# Patient Record
Sex: MALE | Race: WHITE | URBAN - METROPOLITAN AREA
[De-identification: names, ages, dates, MRNs, and addresses within clinical notes are randomized per-mention and may not be internally consistent; named-entity substitution may affect disease eponyms.]

---

## 2022-07-21 ENCOUNTER — OFFICE VISIT (OUTPATIENT)
Dept: URBAN - METROPOLITAN AREA CLINIC 44 | Facility: CLINIC | Age: 57
End: 2022-07-21
Payer: COMMERCIAL

## 2022-07-21 DIAGNOSIS — H04.123 TEAR FILM INSUFFICIENCY OF BILATERAL LACRIMAL GLANDS: ICD-10-CM

## 2022-07-21 DIAGNOSIS — H25.12 AGE-RELATED NUCLEAR CATARACT, LEFT EYE: ICD-10-CM

## 2022-07-21 DIAGNOSIS — Z96.1 PRESENCE OF INTRAOCULAR LENS: ICD-10-CM

## 2022-07-21 DIAGNOSIS — D31.32 BENIGN NEOPLASM OF LEFT CHOROID: ICD-10-CM

## 2022-07-21 DIAGNOSIS — H43.313 VITREOUS MEMBRANES AND STRANDS, BILATERAL: Primary | ICD-10-CM

## 2022-07-21 PROCEDURE — 92004 COMPRE OPH EXAM NEW PT 1/>: CPT | Performed by: OPTOMETRIST

## 2022-07-21 ASSESSMENT — INTRAOCULAR PRESSURE
OS: 19
OD: 18

## 2022-07-21 ASSESSMENT — KERATOMETRY
OS: 45.00
OD: 45.50

## 2022-07-21 ASSESSMENT — VISUAL ACUITY
OS: 20/20
OD: 20/20

## 2022-07-21 NOTE — IMPRESSION/PLAN
Impression: Benign neoplasm of left choroid: D31.32. Plan: PLAN: Discussed finding with patient and observe.   RTC 12 months for complete + OPTOs

## 2022-07-21 NOTE — IMPRESSION/PLAN
Impression: Vitreous membranes and strands, bilateral: H43.313. Patient reports new symptom's of flashes and floaters. No Hx of RD. Fundus exam is unremarkable for retinal breaks, tears, holes or retinal detachments. Plan: PLAN: Discussed S/S of RD/RT. Stressed importance of RTC immediately if S/S progress.

## 2022-07-21 NOTE — IMPRESSION/PLAN
Impression: Age-related nuclear cataract, left eye: H25.12. Visually non-significant cataracts. Patient asymptomatic and comfortable with current level of vision. Plan: PLAN: RTC 12 months for complete exam complete + OCT ([facility]). RTC sooner if patient symptoms become worse. Pt calling for covid-19 results  Recorded as negative in chart, gave result to pt as requested.  Dionne Muñoz RN  [facility] Nurse Advisor    Reason for Disposition    Information only question and nurse able to answer    Protocols used: NO PROTOCOL AVAILABLE - INFORMATION ONLY-[place]

## 2023-11-13 ENCOUNTER — OFFICE VISIT (OUTPATIENT)
Dept: URBAN - METROPOLITAN AREA CLINIC 44 | Facility: CLINIC | Age: 58
End: 2023-11-13
Payer: COMMERCIAL

## 2023-11-13 DIAGNOSIS — H25.12 AGE-RELATED NUCLEAR CATARACT, LEFT EYE: ICD-10-CM

## 2023-11-13 DIAGNOSIS — D31.32 BENIGN NEOPLASM OF LEFT CHOROID: Primary | ICD-10-CM

## 2023-11-13 DIAGNOSIS — H43.313 VITREOUS MEMBRANES AND STRANDS, BILATERAL: ICD-10-CM

## 2023-11-13 DIAGNOSIS — H26.491 OTHER SECONDARY CATARACT, RIGHT EYE: ICD-10-CM

## 2023-11-13 PROCEDURE — 92014 COMPRE OPH EXAM EST PT 1/>: CPT | Performed by: OPTOMETRIST

## 2023-11-13 ASSESSMENT — INTRAOCULAR PRESSURE
OD: 17
OS: 17

## 2023-11-13 ASSESSMENT — KERATOMETRY
OD: 45.38
OS: 45.50

## 2023-11-13 ASSESSMENT — VISUAL ACUITY
OD: 20/20
OS: 20/20

## 2024-11-19 ENCOUNTER — OFFICE VISIT (OUTPATIENT)
Dept: URBAN - METROPOLITAN AREA CLINIC 44 | Facility: CLINIC | Age: 59
End: 2024-11-19
Payer: COMMERCIAL

## 2024-11-19 DIAGNOSIS — D31.32 BENIGN NEOPLASM OF LEFT CHOROID: Primary | ICD-10-CM

## 2024-11-19 DIAGNOSIS — H52.4 PRESBYOPIA: ICD-10-CM

## 2024-11-19 DIAGNOSIS — H25.12 AGE-RELATED NUCLEAR CATARACT, LEFT EYE: ICD-10-CM

## 2024-11-19 PROCEDURE — 92014 COMPRE OPH EXAM EST PT 1/>: CPT | Performed by: OPTOMETRIST

## 2024-11-19 ASSESSMENT — VISUAL ACUITY
OD: 20/20
OS: 20/25

## 2024-11-19 ASSESSMENT — INTRAOCULAR PRESSURE
OD: 16
OS: 15

## 2024-11-19 ASSESSMENT — KERATOMETRY
OD: 45.50
OS: 45.25